# Patient Record
(demographics unavailable — no encounter records)

---

## 2025-05-20 NOTE — HEALTH RISK ASSESSMENT
[Little interest or pleasure doing things] : 1) Little interest or pleasure doing things [Feeling down, depressed, or hopeless] : 2) Feeling down, depressed, or hopeless [0] : 2) Feeling down, depressed, or hopeless: Not at all (0) [PHQ-2 Negative - No further assessment needed] : PHQ-2 Negative - No further assessment needed [de-identified] : I spend 5 min performing a depression screening on this patient [[id number]] : 0 [Never] : Never

## 2025-05-20 NOTE — HISTORY OF PRESENT ILLNESS
[de-identified] : 50-year-old female with past medical history of triple negative breast cancer status post radical mastectomy, achalasia status post balloon dilation who comes annual visit. As per GI achalasia is getting worse due to noncompliance with Prilosec, possible new laser procedure

## 2025-06-12 NOTE — HISTORY OF PRESENT ILLNESS
[FreeTextEntry1] : NPV-spot check  [de-identified] : Renukaolga lidia Hunter 51 y/o F presents for spot check on body -hx of breast cancer  #elbow discoloration Lists the following concerns today: PHx of skin cancer:no  FHx of skin cancer:no

## 2025-06-12 NOTE — ASSESSMENT
[FreeTextEntry1] : #Photoaging/Dermatoheliosis  - Use an adequate amount (1.5 ounces for entire body) of sunscreen >30-SPF   - Reapply sunscreen every 2 hours or immediately after swimming or excessive sweating.  - Seek the shade, especially between 10 am and 4pm  - Use UPF clothing  #LSC on elbows - not itchy - slight darkening - discussed this can be considered normal variant due to natural rubbing on elbow surface - rec OTC urea containing creams BID  #[place] of Trunk - normal reticular patterns on dermoscopy - currently B9 in appearance, reassurance, CTM  #Cherry angiomas - dome shaped red papules on trunk - B9, reassurance - Lesions can be treated with hyfrecation or laser for cosmetic reasons  #Hair loss likely 2/2 chemotherapy treatment - hair in family is good but after chemo for breast cancer pt developed permanent hair thinning; lost hair and had trouble regrowing - plan to [facility] in 2 weeks for biopsy r/o chemo related hair loss vs less likely AGA vs AAi - PPP